# Patient Record
Sex: MALE | Race: WHITE | NOT HISPANIC OR LATINO | Employment: FULL TIME | URBAN - METROPOLITAN AREA
[De-identification: names, ages, dates, MRNs, and addresses within clinical notes are randomized per-mention and may not be internally consistent; named-entity substitution may affect disease eponyms.]

---

## 2019-07-04 ENCOUNTER — APPOINTMENT (OUTPATIENT)
Dept: CT IMAGING | Facility: HOSPITAL | Age: 54
End: 2019-07-04

## 2019-07-04 ENCOUNTER — APPOINTMENT (OUTPATIENT)
Dept: GENERAL RADIOLOGY | Facility: HOSPITAL | Age: 54
End: 2019-07-04

## 2019-07-04 ENCOUNTER — HOSPITAL ENCOUNTER (INPATIENT)
Facility: HOSPITAL | Age: 54
LOS: 1 days | Discharge: LEFT BEFORE DISCHARGE | End: 2019-07-04
Attending: EMERGENCY MEDICINE | Admitting: INTERNAL MEDICINE

## 2019-07-04 ENCOUNTER — APPOINTMENT (OUTPATIENT)
Dept: MRI IMAGING | Facility: HOSPITAL | Age: 54
End: 2019-07-04

## 2019-07-04 VITALS
BODY MASS INDEX: 25.34 KG/M2 | TEMPERATURE: 98.1 F | OXYGEN SATURATION: 99 % | HEIGHT: 70 IN | HEART RATE: 61 BPM | DIASTOLIC BLOOD PRESSURE: 66 MMHG | SYSTOLIC BLOOD PRESSURE: 119 MMHG | WEIGHT: 177.03 LBS | RESPIRATION RATE: 16 BRPM

## 2019-07-04 DIAGNOSIS — R29.810 FACIAL DROOP: ICD-10-CM

## 2019-07-04 DIAGNOSIS — R29.898 UPPER EXTREMITY WEAKNESS: ICD-10-CM

## 2019-07-04 DIAGNOSIS — R29.898 WEAKNESS OF RIGHT LOWER EXTREMITY: ICD-10-CM

## 2019-07-04 DIAGNOSIS — R29.90 STROKE-LIKE SYMPTOMS: Primary | ICD-10-CM

## 2019-07-04 LAB
ABO GROUP BLD: NORMAL
ALBUMIN SERPL-MCNC: 4.2 G/DL (ref 3.5–5.2)
ALBUMIN/GLOB SERPL: 1.7 G/DL
ALP SERPL-CCNC: 70 U/L (ref 39–117)
ALT SERPL W P-5'-P-CCNC: 21 U/L (ref 1–41)
ANION GAP SERPL CALCULATED.3IONS-SCNC: 9.8 MMOL/L (ref 5–15)
APTT PPP: 26.9 SECONDS (ref 22.7–35.4)
AST SERPL-CCNC: 23 U/L (ref 1–40)
BASOPHILS # BLD AUTO: 0.05 10*3/MM3 (ref 0–0.2)
BASOPHILS NFR BLD AUTO: 1 % (ref 0–1.5)
BILIRUB SERPL-MCNC: <0.2 MG/DL (ref 0.2–1.2)
BLD GP AB SCN SERPL QL: NEGATIVE
BUN BLD-MCNC: 17 MG/DL (ref 6–20)
BUN/CREAT SERPL: 21.5 (ref 7–25)
CALCIUM SPEC-SCNC: 9.5 MG/DL (ref 8.6–10.5)
CHLORIDE SERPL-SCNC: 108 MMOL/L (ref 98–107)
CO2 SERPL-SCNC: 29.2 MMOL/L (ref 22–29)
CREAT BLD-MCNC: 0.79 MG/DL (ref 0.76–1.27)
CRP SERPL-MCNC: 0.06 MG/DL (ref 0–0.5)
DEPRECATED RDW RBC AUTO: 46.6 FL (ref 37–54)
EOSINOPHIL # BLD AUTO: 0.41 10*3/MM3 (ref 0–0.4)
EOSINOPHIL NFR BLD AUTO: 8.4 % (ref 0.3–6.2)
ERYTHROCYTE [DISTWIDTH] IN BLOOD BY AUTOMATED COUNT: 13.6 % (ref 12.3–15.4)
ETHANOL BLD-MCNC: <10 MG/DL (ref 0–10)
ETHANOL UR QL: <0.01 %
FOLATE SERPL-MCNC: 11.4 NG/ML (ref 4.78–24.2)
GFR SERPL CREATININE-BSD FRML MDRD: 102 ML/MIN/1.73
GLOBULIN UR ELPH-MCNC: 2.5 GM/DL
GLUCOSE BLD-MCNC: 121 MG/DL (ref 65–99)
GLUCOSE BLDC GLUCOMTR-MCNC: 109 MG/DL (ref 70–130)
GLUCOSE BLDC GLUCOMTR-MCNC: 113 MG/DL (ref 70–130)
HCT VFR BLD AUTO: 42.3 % (ref 37.5–51)
HCYS SERPL-MCNC: 9.6 UMOL/L (ref 0–15)
HGB BLD-MCNC: 13.8 G/DL (ref 13–17.7)
HOLD SPECIMEN: NORMAL
HOLD SPECIMEN: NORMAL
IMM GRANULOCYTES # BLD AUTO: 0.01 10*3/MM3 (ref 0–0.05)
IMM GRANULOCYTES NFR BLD AUTO: 0.2 % (ref 0–0.5)
INR PPP: 0.99 (ref 0.9–1.1)
LYMPHOCYTES # BLD AUTO: 1.39 10*3/MM3 (ref 0.7–3.1)
LYMPHOCYTES NFR BLD AUTO: 28.6 % (ref 19.6–45.3)
MCH RBC QN AUTO: 30.5 PG (ref 26.6–33)
MCHC RBC AUTO-ENTMCNC: 32.6 G/DL (ref 31.5–35.7)
MCV RBC AUTO: 93.6 FL (ref 79–97)
MONOCYTES # BLD AUTO: 0.62 10*3/MM3 (ref 0.1–0.9)
MONOCYTES NFR BLD AUTO: 12.8 % (ref 5–12)
NEUTROPHILS # BLD AUTO: 2.38 10*3/MM3 (ref 1.7–7)
NEUTROPHILS NFR BLD AUTO: 49 % (ref 42.7–76)
NRBC BLD AUTO-RTO: 0 /100 WBC (ref 0–0.2)
PLATELET # BLD AUTO: 222 10*3/MM3 (ref 140–450)
PMV BLD AUTO: 8.9 FL (ref 6–12)
POTASSIUM BLD-SCNC: 4.2 MMOL/L (ref 3.5–5.2)
PROT SERPL-MCNC: 6.7 G/DL (ref 6–8.5)
PROTHROMBIN TIME: 12.8 SECONDS (ref 11.7–14.2)
RBC # BLD AUTO: 4.52 10*6/MM3 (ref 4.14–5.8)
RH BLD: POSITIVE
RPR SER QL: NORMAL
SODIUM BLD-SCNC: 147 MMOL/L (ref 136–145)
T&S EXPIRATION DATE: NORMAL
TROPONIN T SERPL-MCNC: <0.01 NG/ML (ref 0–0.03)
TSH SERPL DL<=0.05 MIU/L-ACNC: 1.2 MIU/ML (ref 0.27–4.2)
VIT B12 BLD-MCNC: 543 PG/ML (ref 211–946)
WBC NRBC COR # BLD: 4.86 10*3/MM3 (ref 3.4–10.8)
WHOLE BLOOD HOLD SPECIMEN: NORMAL
WHOLE BLOOD HOLD SPECIMEN: NORMAL

## 2019-07-04 PROCEDURE — A9577 INJ MULTIHANCE: HCPCS | Performed by: INTERNAL MEDICINE

## 2019-07-04 PROCEDURE — 25010000002 ALTEPLASE PER 1 MG

## 2019-07-04 PROCEDURE — 82962 GLUCOSE BLOOD TEST: CPT

## 2019-07-04 PROCEDURE — G0378 HOSPITAL OBSERVATION PER HR: HCPCS

## 2019-07-04 PROCEDURE — 85025 COMPLETE CBC W/AUTO DIFF WBC: CPT | Performed by: EMERGENCY MEDICINE

## 2019-07-04 PROCEDURE — 86901 BLOOD TYPING SEROLOGIC RH(D): CPT | Performed by: EMERGENCY MEDICINE

## 2019-07-04 PROCEDURE — 80307 DRUG TEST PRSMV CHEM ANLYZR: CPT | Performed by: EMERGENCY MEDICINE

## 2019-07-04 PROCEDURE — 85610 PROTHROMBIN TIME: CPT | Performed by: EMERGENCY MEDICINE

## 2019-07-04 PROCEDURE — 86592 SYPHILIS TEST NON-TREP QUAL: CPT | Performed by: PSYCHIATRY & NEUROLOGY

## 2019-07-04 PROCEDURE — 25010000002 ALTEPLASE PER 1 MG: Performed by: EMERGENCY MEDICINE

## 2019-07-04 PROCEDURE — 99253 IP/OBS CNSLTJ NEW/EST LOW 45: CPT | Performed by: PSYCHIATRY & NEUROLOGY

## 2019-07-04 PROCEDURE — 70498 CT ANGIOGRAPHY NECK: CPT

## 2019-07-04 PROCEDURE — 71045 X-RAY EXAM CHEST 1 VIEW: CPT

## 2019-07-04 PROCEDURE — 84484 ASSAY OF TROPONIN QUANT: CPT | Performed by: EMERGENCY MEDICINE

## 2019-07-04 PROCEDURE — 83090 ASSAY OF HOMOCYSTEINE: CPT | Performed by: PSYCHIATRY & NEUROLOGY

## 2019-07-04 PROCEDURE — 82565 ASSAY OF CREATININE: CPT

## 2019-07-04 PROCEDURE — 85730 THROMBOPLASTIN TIME PARTIAL: CPT | Performed by: EMERGENCY MEDICINE

## 2019-07-04 PROCEDURE — 82607 VITAMIN B-12: CPT | Performed by: PSYCHIATRY & NEUROLOGY

## 2019-07-04 PROCEDURE — 0 IOPAMIDOL PER 1 ML: Performed by: EMERGENCY MEDICINE

## 2019-07-04 PROCEDURE — 93005 ELECTROCARDIOGRAM TRACING: CPT | Performed by: EMERGENCY MEDICINE

## 2019-07-04 PROCEDURE — 93010 ELECTROCARDIOGRAM REPORT: CPT | Performed by: INTERNAL MEDICINE

## 2019-07-04 PROCEDURE — 86140 C-REACTIVE PROTEIN: CPT | Performed by: PSYCHIATRY & NEUROLOGY

## 2019-07-04 PROCEDURE — 0042T HC CT CEREBRAL PERFUSION W/WO CONTRAST: CPT

## 2019-07-04 PROCEDURE — 86850 RBC ANTIBODY SCREEN: CPT | Performed by: EMERGENCY MEDICINE

## 2019-07-04 PROCEDURE — 82746 ASSAY OF FOLIC ACID SERUM: CPT | Performed by: PSYCHIATRY & NEUROLOGY

## 2019-07-04 PROCEDURE — 70496 CT ANGIOGRAPHY HEAD: CPT

## 2019-07-04 PROCEDURE — 3E03317 INTRODUCTION OF OTHER THROMBOLYTIC INTO PERIPHERAL VEIN, PERCUTANEOUS APPROACH: ICD-10-PCS | Performed by: EMERGENCY MEDICINE

## 2019-07-04 PROCEDURE — 99285 EMERGENCY DEPT VISIT HI MDM: CPT

## 2019-07-04 PROCEDURE — 84443 ASSAY THYROID STIM HORMONE: CPT | Performed by: PSYCHIATRY & NEUROLOGY

## 2019-07-04 PROCEDURE — 80053 COMPREHEN METABOLIC PANEL: CPT | Performed by: EMERGENCY MEDICINE

## 2019-07-04 PROCEDURE — 86900 BLOOD TYPING SEROLOGIC ABO: CPT | Performed by: EMERGENCY MEDICINE

## 2019-07-04 PROCEDURE — 0 GADOBENATE DIMEGLUMINE 529 MG/ML SOLUTION: Performed by: INTERNAL MEDICINE

## 2019-07-04 PROCEDURE — 70553 MRI BRAIN STEM W/O & W/DYE: CPT

## 2019-07-04 RX ORDER — SODIUM CHLORIDE 0.9 % (FLUSH) 0.9 %
3-10 SYRINGE (ML) INJECTION AS NEEDED
Status: DISCONTINUED | OUTPATIENT
Start: 2019-07-04 | End: 2019-07-04 | Stop reason: HOSPADM

## 2019-07-04 RX ORDER — LORAZEPAM 1 MG/1
1 TABLET ORAL EVERY 8 HOURS
Status: DISCONTINUED | OUTPATIENT
Start: 2019-07-05 | End: 2019-07-04 | Stop reason: HOSPADM

## 2019-07-04 RX ORDER — ASPIRIN 325 MG
325 TABLET ORAL DAILY
Status: DISCONTINUED | OUTPATIENT
Start: 2019-07-05 | End: 2019-07-04 | Stop reason: HOSPADM

## 2019-07-04 RX ORDER — SODIUM CHLORIDE 0.9 % (FLUSH) 0.9 %
10 SYRINGE (ML) INJECTION AS NEEDED
Status: DISCONTINUED | OUTPATIENT
Start: 2019-07-04 | End: 2019-07-04 | Stop reason: HOSPADM

## 2019-07-04 RX ORDER — LORAZEPAM 1 MG/1
1 TABLET ORAL EVERY 6 HOURS
Status: DISCONTINUED | OUTPATIENT
Start: 2019-07-04 | End: 2019-07-04 | Stop reason: HOSPADM

## 2019-07-04 RX ORDER — SODIUM CHLORIDE 0.9 % (FLUSH) 0.9 %
3 SYRINGE (ML) INJECTION EVERY 12 HOURS SCHEDULED
Status: DISCONTINUED | OUTPATIENT
Start: 2019-07-04 | End: 2019-07-04 | Stop reason: HOSPADM

## 2019-07-04 RX ORDER — ASPIRIN 600 MG
300 SUPPOSITORY, RECTAL RECTAL DAILY
Status: DISCONTINUED | OUTPATIENT
Start: 2019-07-05 | End: 2019-07-04 | Stop reason: HOSPADM

## 2019-07-04 RX ORDER — LANOLIN ALCOHOL/MO/W.PET/CERES
400 CREAM (GRAM) TOPICAL DAILY
Status: DISCONTINUED | OUTPATIENT
Start: 2019-07-04 | End: 2019-07-04 | Stop reason: HOSPADM

## 2019-07-04 RX ORDER — SODIUM CHLORIDE 9 MG/ML
100 INJECTION, SOLUTION INTRAVENOUS ONCE
Status: COMPLETED | OUTPATIENT
Start: 2019-07-04 | End: 2019-07-04

## 2019-07-04 RX ORDER — PROPRANOLOL HYDROCHLORIDE 20 MG/1
20 TABLET ORAL DAILY
COMMUNITY

## 2019-07-04 RX ORDER — THIAMINE MONONITRATE (VIT B1) 100 MG
100 TABLET ORAL DAILY
Status: DISCONTINUED | OUTPATIENT
Start: 2019-07-04 | End: 2019-07-04 | Stop reason: HOSPADM

## 2019-07-04 RX ORDER — ATORVASTATIN CALCIUM 80 MG/1
80 TABLET, FILM COATED ORAL NIGHTLY
Status: DISCONTINUED | OUTPATIENT
Start: 2019-07-04 | End: 2019-07-04 | Stop reason: HOSPADM

## 2019-07-04 RX ADMIN — Medication 100 MG: at 12:36

## 2019-07-04 RX ADMIN — Medication 400 MCG: at 12:36

## 2019-07-04 RX ADMIN — SODIUM CHLORIDE 100 ML: 9 INJECTION, SOLUTION INTRAVENOUS at 09:43

## 2019-07-04 RX ADMIN — GADOBENATE DIMEGLUMINE 16 ML: 529 INJECTION, SOLUTION INTRAVENOUS at 13:25

## 2019-07-04 RX ADMIN — Medication 3 ML: at 09:17

## 2019-07-04 RX ADMIN — IOPAMIDOL 150 ML: 755 INJECTION, SOLUTION INTRAVENOUS at 08:15

## 2019-07-04 RX ADMIN — WATER 66 MG: 1 INJECTION INTRAMUSCULAR; INTRAVENOUS; SUBCUTANEOUS at 08:49

## 2019-07-04 NOTE — H&P
[facility]    CC: right le weakness    HPI:  Mr. Gutierrez is a 53yo WM he was brought to ER via EMS today with sudden onset of right lower extremity weakness that began at 700. No alleviating or exacerbating factors. Constant.  He then developed right arm weakness and facial drop.  They did start to improve on their own.  Ct head showed no bleed and he was given tpa.  He has no perfusion abnormality on ct head and his NIH has already improved to 0 at this point.    Past Medical History:   Diagnosis Date   • Hypertension      Social History     Socioeconomic History   • Marital status: Significant Other     Spouse name: Not on file   • Number of children: Not on file   • Years of education: Not on file   • Highest education level: Not on file   Tobacco Use   • Smoking status: Never Smoker   • Smokeless tobacco: Never Used   Substance and Sexual Activity   • Alcohol use: Yes     Alcohol/week: 0.6 oz     Types: 1 Shots of liquor per week   • Drug use: No   • Sexual activity: Defer     etoh every night not just once a week.     History reviewed. No pertinent family history.  MEDS: reviewed as per chart  ALL: NKDA  ROS:   Constitutional: Negative for activity change, appetite change and fever.   HENT: Negative for congestion and sore throat.    Eyes: Negative.  Negative for visual disturbance.   Respiratory: Negative for cough and shortness of breath.    Cardiovascular: Negative for chest pain and leg swelling.   Gastrointestinal: Negative for abdominal pain, diarrhea and vomiting.   Endocrine: Negative.    Genitourinary: Negative for decreased urine volume and dysuria.   Musculoskeletal: Negative for neck pain.   Skin: Negative for rash and wound.   Allergic/Immunologic: Negative.    Neurological: Positive for facial asymmetry (R sided droop) and weakness (R arm and leg). Negative for dizziness, speech difficulty, light-headedness, numbness and headaches.   Hematological: Negative.    Psychiatric/Behavioral:  Negative.    All other systems reviewed and are negative.    Vital Sign Min/Max for last 24 hours  Temp  Min: 98.7 °F (37.1 °C)  Max: 98.7 °F (37.1 °C)   BP  Min: 120/90  Max: 130/87   Pulse  Min: 60  Max: 72   Resp  Min: 18  Max: 18   SpO2  Min: 95 %  Max: 100 %   Flow (L/min)  Min: 2  Max: 2   Weight  Min: 81.1 kg (178 lb 12.7 oz)  Max: 81.1 kg (178 lb 12.7 oz)     GEN:  NAD, AxOx3  HEENT: PERRL, EOMI, no icterus, mmm, no jvd, trachea midline, neck supple, normal conjunctiva, and no palpable thyroid nodules  CHEST: CTA bilat, no wheezes, no crackles, no use of accessory muscles  CV: RRR, no m/g/r  ABD: soft, nt, nd +bs, no hepatosplenomegaly  EXT: no c/c/e  SKIN: no rashes, no xanthomas, nl turgor  LYMPH: no palpable cervical or supraclavicular lymphadenopathy  NEURO: CN 2-12 intact and symmetric bilaterally  PSYCH: nl affect, nl orientation, nl judgement, nl mood  MSK: no kyphoscoliosis, 5/5 strength ue and le bilaterally with good rom.     Labs: 7/4: reviewed:  Glucose 121  Bun 17  Na 147  Bicarb 29  Wbc 4.86  hgb 13.8  plts 222    Cxr: nad  Ct head: 7/4: reviewed:  1. No perfusion abnormality to suggest completed or threatened acute  infarct.  2. No arterial cutoff or high-grade arterial stenosis identified.  3. No acute intracranial hemorrhage or hydrocephalus. Slight asymmetric  hyperdensity at posterior medial left occipital lobe could be evidence  of prior ischemic injury. No enhancing lesions of brain. If indicated,  further evaluation with MRI could be considered.    A/P:  1. Acute CVA s/p TPA -- continue neurochecks in ICU: care as per post TPA protocol. Will need echo. Hgba1c; lipid panel;   2. HTN -- permissive at the moment, prn meds available if needed as per goal bp  3. etoh abuse -- will monitor for withdrawal, ciwa. Given thiamine, folate  4. Essential tremor    D/w RN and with family at bedside.

## 2019-07-04 NOTE — NURSING NOTE
Pt left AMA - paperwork is signed and MD notified.   Risks were explained and discussed in detail.   IV's removed. Significant other walked out with the patient.

## 2019-07-04 NOTE — CONSULTS
Neurology Note    Patient:  Darryl Gutierrez    YOB: 1965    REFERRING PHYSICIAN:  Galileo Anderson MD    CHIEF COMPLAINT:    weakness    HISTORY OF PRESENT ILLNESS:   The patient is a 54 y.o. male with h/o HTN and essential tremor in ED with sudden onset of right sided facial and limb weakness around 0700 today, initial NIHSS 4, /100, NSR, , CT/CTP/CTA negative. Reports a prior attack of right sided weakness at age 32 at which lasted for two days and resolved without a residual. He was hospitalized and thinks that his work up was negative but does not recall details. Denies headaches, h/o migraine, smoking, drug use.    Past Medical History:  Past Medical History:   Diagnosis Date   • Hypertension        Past Surgical History:  History reviewed. No pertinent surgical history.    Social History:   Social History     Socioeconomic History   • Marital status: Significant Other     Spouse name: Not on file   • Number of children: Not on file   • Years of education: Not on file   • Highest education level: Not on file   Tobacco Use   • Smoking status: Unknown If Ever Smoked   Substance and Sexual Activity   • Alcohol use: Yes     Alcohol/week: 0.6 oz     Types: 1 Shots of liquor per week   • Drug use: No   • Sexual activity: Defer        Family History:   History reviewed. No pertinent family history.    Medications Prior to Admission:    Prior to Admission medications    Medication Sig Start Date End Date Taking? Authorizing Provider   propranolol (INDERAL) 20 MG tablet Take 20 mg by mouth Daily.   Yes Provider, MD Paul       Allergies:  Patient has no known allergies.      Review of system  Review of Systems   Neurological: Positive for tremors, facial asymmetry and weakness. Negative for speech difficulty, numbness and headaches.   All other systems reviewed and are negative.      Vitals:    07/04/19 0853   BP: 120/90   Pulse: 63   Resp: 18   Temp:    SpO2: 98%       Physical  exam  Physical Exam   Constitutional: He is oriented to person, place, and time. He appears well-developed and well-nourished.   HENT:   Head: Normocephalic and atraumatic.   Eyes: EOM are normal. Pupils are equal, round, and reactive to light.   Neck: Carotid bruit is not present.   Cardiovascular: Normal rate and regular rhythm.   Pulmonary/Chest: Effort normal.   Neurological: He is alert and oriented to person, place, and time. He has normal reflexes. No sensory deficit.   Mild to moderate right lower facial droop, right pronator and leg drift, DTRs hypoactive, Babinskis indeterminate because of bilateral withdrawal.   Psychiatric: He has a normal mood and affect. His behavior is normal. Thought content normal.         Lab Results   Component Value Date    WBC 4.86 07/04/2019    HGB 13.8 07/04/2019    HCT 42.3 07/04/2019    MCV 93.6 07/04/2019     07/04/2019     Lab Results   Component Value Date    GLUCOSE 121 (H) 07/04/2019    BUN 17 07/04/2019    CREATININE 0.79 07/04/2019    EGFRIFNONA 102 07/04/2019    BCR 21.5 07/04/2019    CO2 29.2 (H) 07/04/2019    CALCIUM 9.5 07/04/2019    ALBUMIN 4.20 07/04/2019    AST 23 07/04/2019    ALT 21 07/04/2019     ECG 12 Lead   Order: [id number]   Status:  Preliminary result   Visible to patient:  No (Not Released)   Next appt:  None      Narrative     HEART RATE= 63  bpm  RR Interval= 960  ms  [place] Interval= 173  ms  P Horizontal Axis= -4  deg  P Front Axis= 43  deg  QRSD Interval= 104  ms  QT Interval= 407  ms  QRS Axis= -34  deg  T Wave Axis= -14  deg  - BORDERLINE ECG -  Sinus rhythm  Left axis deviation  Borderline T abnormalities, inferior leads  Electronically Signed By:   Date and Time of Study: 2019-07-04 08:31:05      Specimen Collected: 07/04/19 08:31 Last Resulted: 07/04/19 08:31               Radiological Studies:    Ct Angiogram Head With & Without Contrast    Result Date: 7/4/2019  CT ANGIOGRAM HEAD AND NECK AND CT PERFUSION STUDY  CLINICAL HISTORY: Right  sided weakness.  Radiation dose reduction techniques were utilized, including automated exposure control and exposure modulation based on body size. CT scan of the head was obtained with 3 mm axial images without the use of IV contrast. The patient underwent a CT perfusion study with a dynamic bolus of IV contrast. Standard perfusion maps were constructed. The patient then underwent a CT angiogram of the head and neck with 1 mm axial images following the administration of IV contrast. Sagittal, coronal, and 3-dimensional reconstructed images were obtained.  Percent stenosis was assessed in accordance with NASCET criteria.  FINDINGS:  NONCONTRAST HEAD CT: On the noncontrast head CT, no acute hemorrhage or hydrocephalus is identified. Slight asymmetric hypodensity posterior medially at the left occipital lobe, for example image 36 of series 1, potentially evidence of prior ischemic injury.  Arterial calcifications are seen at the base of the brain.  Mild mucosal thickening in maxillary and sphenoid sinuses, partial opacification of ethmoid air cells, hypoplastic frontal sinuses.  No enhancing lesions of brain are noted following contrast material administration.   CT PERFUSION STUDY:  No CBF (under 30%) deficit or perfusion abnormality is demonstrated where the T MAX is greater than 6 seconds. The calculated mismatch volume was 0 cc.  CTA HEAD and neck: The CT angiogram of the head and neck demonstrates no hemodynamically significant focal stenosis, aneurysm, or dissection in the cervical carotid or vertebral arteries, or in the arteries at the base of the brain. Left P1 segment is diminutive, left PCA being at least predominantly supplied by the anterior circulation. Calcifications are seen in the bilateral intracranial carotid arteries, but without significant stenosis (0-49% stenosis).          1. No perfusion abnormality to suggest completed or threatened acute infarct. 2. No arterial cutoff or high-grade arterial  stenosis identified. 3. No acute intracranial hemorrhage or hydrocephalus. Slight asymmetric hyperdensity at posterior medial left occipital lobe could be evidence of prior ischemic injury. No enhancing lesions of brain. If indicated, further evaluation with MRI could be considered.    Discussed by telephone with Dr. Walker at 0 810, 0 8:25, 07/04/2019.  This report was finalized on 7/4/2019 8:26 AM by Dr. Paulo Pate M.D.      Ct Angiogram Neck With & Without Contrast    Result Date: 7/4/2019  CT ANGIOGRAM HEAD AND NECK AND CT PERFUSION STUDY  CLINICAL HISTORY: Right sided weakness.  Radiation dose reduction techniques were utilized, including automated exposure control and exposure modulation based on body size. CT scan of the head was obtained with 3 mm axial images without the use of IV contrast. The patient underwent a CT perfusion study with a dynamic bolus of IV contrast. Standard perfusion maps were constructed. The patient then underwent a CT angiogram of the head and neck with 1 mm axial images following the administration of IV contrast. Sagittal, coronal, and 3-dimensional reconstructed images were obtained.  Percent stenosis was assessed in accordance with NASCET criteria.  FINDINGS:  NONCONTRAST HEAD CT: On the noncontrast head CT, no acute hemorrhage or hydrocephalus is identified. Slight asymmetric hypodensity posterior medially at the left occipital lobe, for example image 36 of series 1, potentially evidence of prior ischemic injury.  Arterial calcifications are seen at the base of the brain.  Mild mucosal thickening in maxillary and sphenoid sinuses, partial opacification of ethmoid air cells, hypoplastic frontal sinuses.  No enhancing lesions of brain are noted following contrast material administration.   CT PERFUSION STUDY:  No CBF (under 30%) deficit or perfusion abnormality is demonstrated where the T MAX is greater than 6 seconds. The calculated mismatch volume was 0 cc.  CTA HEAD  and neck: The CT angiogram of the head and neck demonstrates no hemodynamically significant focal stenosis, aneurysm, or dissection in the cervical carotid or vertebral arteries, or in the arteries at the base of the brain. Left P1 segment is diminutive, left PCA being at least predominantly supplied by the anterior circulation. Calcifications are seen in the bilateral intracranial carotid arteries, but without significant stenosis (0-49% stenosis).          1. No perfusion abnormality to suggest completed or threatened acute infarct. 2. No arterial cutoff or high-grade arterial stenosis identified. 3. No acute intracranial hemorrhage or hydrocephalus. Slight asymmetric hyperdensity at posterior medial left occipital lobe could be evidence of prior ischemic injury. No enhancing lesions of brain. If indicated, further evaluation with MRI could be considered.    Discussed by telephone with Dr. Walker at 0 810, 0 8:25, 07/04/2019.  This report was finalized on 7/4/2019 8:26 AM by Dr. Paulo Pate M.D.      Xr Chest 1 View    Result Date: 7/4/2019  XR CHEST 1 VW-  HISTORY: Male who is 54 years-old,  stroke  TECHNIQUE: Frontal view of the chest  COMPARISON: None available  FINDINGS: Heart, mediastinum and pulmonary vasculature are unremarkable. No focal pulmonary consolidation, pleural effusion, or pneumothorax. No acute osseous process.      No evidence for acute pulmonary process. Follow-up as clinical indications persist.  This report was finalized on 7/4/2019 8:37 AM by Dr. Paulo Pate M.D.      Ct Cerebral Perfusion With & Without Contrast    Result Date: 7/4/2019  CT ANGIOGRAM HEAD AND NECK AND CT PERFUSION STUDY  CLINICAL HISTORY: Right sided weakness.  Radiation dose reduction techniques were utilized, including automated exposure control and exposure modulation based on body size. CT scan of the head was obtained with 3 mm axial images without the use of IV contrast. The patient underwent a CT  perfusion study with a dynamic bolus of IV contrast. Standard perfusion maps were constructed. The patient then underwent a CT angiogram of the head and neck with 1 mm axial images following the administration of IV contrast. Sagittal, coronal, and 3-dimensional reconstructed images were obtained.  Percent stenosis was assessed in accordance with NASCET criteria.  FINDINGS:  NONCONTRAST HEAD CT: On the noncontrast head CT, no acute hemorrhage or hydrocephalus is identified. Slight asymmetric hypodensity posterior medially at the left occipital lobe, for example image 36 of series 1, potentially evidence of prior ischemic injury.  Arterial calcifications are seen at the base of the brain.  Mild mucosal thickening in maxillary and sphenoid sinuses, partial opacification of ethmoid air cells, hypoplastic frontal sinuses.  No enhancing lesions of brain are noted following contrast material administration.   CT PERFUSION STUDY:  No CBF (under 30%) deficit or perfusion abnormality is demonstrated where the T MAX is greater than 6 seconds. The calculated mismatch volume was 0 cc.  CTA HEAD and neck: The CT angiogram of the head and neck demonstrates no hemodynamically significant focal stenosis, aneurysm, or dissection in the cervical carotid or vertebral arteries, or in the arteries at the base of the brain. Left P1 segment is diminutive, left PCA being at least predominantly supplied by the anterior circulation. Calcifications are seen in the bilateral intracranial carotid arteries, but without significant stenosis (0-49% stenosis).          1. No perfusion abnormality to suggest completed or threatened acute infarct. 2. No arterial cutoff or high-grade arterial stenosis identified. 3. No acute intracranial hemorrhage or hydrocephalus. Slight asymmetric hyperdensity at posterior medial left occipital lobe could be evidence of prior ischemic injury. No enhancing lesions of brain. If indicated, further evaluation with MRI  could be considered.    Discussed by telephone with Dr. Walker at 0 810, 0 8:25, 07/04/2019.  This report was finalized on 7/4/2019 8:26 AM by Dr. Paulo Pate M.D.          During this visit the following were done:  Labs Reviewed [x]    Labs Ordered []    Radiology Reports Reviewed [x]    Radiology Ordered []    EKG, echo, and/or stress test reviewed [x]    EEG results reviewed  []    EEG reviewed and interpreted per myself   []    Discussed case with neurointerventionalist or neuroradiologist []    Referring Provider Records Reviewed []    ER Records Reviewed [x]    Hospital Records Reviewed []    History Obtained From Family []    Radiological images view and Interpreted per myself [x]    Case Discussed with referring provider [x]     Decision to obtain and request outside records  []        Assessment and Plan     Threatened left hemispheric vs pontine stroke, suspect lacunar given negative CTP/CTA. TPA candidate.     - ICU observation per TPA protocol.   - NPO until cleared.   - -180, DBP <110.   - MRI brain w/wo.   - TTE.   - CRP, RPR, b12, folate, TSH.    Thanks,    Electronically signed by Maxwell Luke MD on 7/4/2019 at 9:02 AM

## 2019-07-04 NOTE — ED PROVIDER NOTES
EMERGENCY DEPARTMENT ENCOUNTER    CHIEF COMPLAINT  Chief Complaint: Extremity Weakness   History given by: Patient   History limited by: none   Room Number: 16/16  PMD: Provider, No Known      HPI:  Pt is a 54 y.o. male who presents to the ED via EMS complaining of R lower extremity weakness that began upon waking up at 0700 this AM. Per pt, weakness of leg led to fall upon getting out of bed. Pt states he developed R arm weakness and facial droop as well, but pt denies any dizziness, lightheadedness, HA, speech changes, visual changes, or numbness. Per pt, symptoms have improved since onset. Pt states he has hx of one episode of TIA 20 year ago that lasted 48 hours, with symptoms resolved without any findings. Per pt, he has hx of EtOH usage every night and hx of essential tremor managed with Propanolol, but denies recreational drug abuse, hx of HTN, hx of cardiac problems, or any other significant medical issues.     Duration:  Constant   Onset: gradual   Timing: constant   Location: R leg   Radiation: none   Quality: weakness   Intensity/Severity: moderate to severe   Progression: improved   Associated Symptoms: R arm weakness and facial droop   Aggravating Factors: none   Alleviating Factors: none   Previous Episodes: Pt has one similar episode 20 years ago.   Treatment before arrival: none     PAST MEDICAL HISTORY  Active Ambulatory Problems     Diagnosis Date Noted   • No Active Ambulatory Problems     Resolved Ambulatory Problems     Diagnosis Date Noted   • No Resolved Ambulatory Problems     Past Medical History:   Diagnosis Date   • Hypertension        PAST SURGICAL HISTORY  History reviewed. No pertinent surgical history.    FAMILY HISTORY  History reviewed. No pertinent family history.    SOCIAL HISTORY  Social History     Socioeconomic History   • Marital status: Significant Other     Spouse name: Not on file   • Number of children: Not on file   • Years of education: Not on file   • Highest education  level: Not on file   Tobacco Use   • Smoking status: Unknown If Ever Smoked   Substance and Sexual Activity   • Alcohol use: Yes     Alcohol/week: 0.6 oz     Types: 1 Shots of liquor per week   • Drug use: No   • Sexual activity: Defer       ALLERGIES  Patient has no known allergies.    REVIEW OF SYSTEMS  Review of Systems   Constitutional: Negative for activity change, appetite change and fever.   HENT: Negative for congestion and sore throat.    Eyes: Negative.  Negative for visual disturbance.   Respiratory: Negative for cough and shortness of breath.    Cardiovascular: Negative for chest pain and leg swelling.   Gastrointestinal: Negative for abdominal pain, diarrhea and vomiting.   Endocrine: Negative.    Genitourinary: Negative for decreased urine volume and dysuria.   Musculoskeletal: Negative for neck pain.   Skin: Negative for rash and wound.   Allergic/Immunologic: Negative.    Neurological: Positive for facial asymmetry (R sided droop) and weakness (R arm and leg). Negative for dizziness, speech difficulty, light-headedness, numbness and headaches.   Hematological: Negative.    Psychiatric/Behavioral: Negative.    All other systems reviewed and are negative.      PHYSICAL EXAM  ED Triage Vitals   Temp Pulse Resp BP SpO2   -- -- -- -- --      Temp src Heart Rate Source Patient Position BP Location FiO2 (%)   -- -- -- -- --       Physical Exam   Constitutional: He is oriented to person, place, and time and well-developed, well-nourished, and in no distress. No distress.   HENT:   Mouth/Throat: Mucous membranes are normal.   Eyes: EOM are normal. Pupils are equal, round, and reactive to light.   Neck: Normal range of motion.   Cardiovascular: Normal rate and regular rhythm. Exam reveals no gallop and no friction rub.   No murmur heard.  Pulmonary/Chest: Effort normal. No respiratory distress. He has no wheezes. He has no rhonchi. He has no rales.   Abdominal: Soft. He exhibits no distension. There is no  tenderness. There is no guarding.   Musculoskeletal: He exhibits no deformity.   Neurological: He is alert and oriented to person, place, and time. He has normal sensation. He is not disoriented. He displays weakness (4/5 strength of RUE and RLE) and facial asymmetry (R sided). He displays normal speech. He has an abnormal Finger-Nose-Finger Test (with RUE and RLE) and an abnormal Heel to Shin Test (with RUE and RLE). He shows pronator drift. GCS score is 15.   See NIHS scale.    Skin: Skin is warm and dry.   Psychiatric:   Calm, cooperative       LAB RESULTS  Lab Results (last 24 hours)     Procedure Component Value Units Date/Time    POC Glucose Once [[id number]]  (Normal) Collected:  07/04/19 0758    Specimen:  Blood Updated:  07/04/19 0759     Glucose 113 mg/dL     CBC & Differential [[id number]] Collected:  07/04/19 0759    Specimen:  Blood Updated:  07/04/19 0808    Narrative:       The following orders were created for panel order CBC & Differential.  Procedure                               Abnormality         Status                     ---------                               -----------         ------                     CBC Auto Differential[[id number]]        Abnormal            Final result                 Please view results for these tests on the individual orders.    Comprehensive Metabolic Panel [[id number]]  (Abnormal) Collected:  07/04/19 0759    Specimen:  Blood Updated:  07/04/19 0822     Glucose 121 mg/dL      BUN 17 mg/dL      Creatinine 0.79 mg/dL      Sodium 147 mmol/L      Potassium 4.2 mmol/L      Chloride 108 mmol/L      CO2 29.2 mmol/L      Calcium 9.5 mg/dL      Total Protein 6.7 g/dL      Albumin 4.20 g/dL      ALT (SGPT) 21 U/L      AST (SGOT) 23 U/L      Alkaline Phosphatase 70 U/L      Total Bilirubin <0.2 mg/dL      eGFR Non African Amer 102 mL/min/1.73      Globulin 2.5 gm/dL      A/G Ratio 1.7 g/dL      BUN/Creatinine Ratio 21.5     Anion Gap 9.8 mmol/L     Narrative:       GFR Normal  >60  Chronic Kidney Disease <60  Kidney Failure <15    Protime-INR [[id number]] Collected:  07/04/19 0759    Specimen:  Blood Updated:  07/04/19 0804    aPTT [[id number]] Collected:  07/04/19 0759    Specimen:  Blood Updated:  07/04/19 0804    Troponin [[id number]]  (Normal) Collected:  07/04/19 0759    Specimen:  Blood Updated:  07/04/19 0822     Troponin T <0.010 ng/mL     Narrative:       Troponin T Reference Range:  <= 0.03 ng/mL-   Negative for AMI  >0.03 ng/mL-     Abnormal for myocardial necrosis.  Clinicians would have to utilize clinical acumen, EKG, Troponin and serial changes to determine if it is an Acute Myocardial Infarction or myocardial injury due to an underlying chronic condition.     CBC Auto Differential [[id number]]  (Abnormal) Collected:  07/04/19 0759    Specimen:  Blood Updated:  07/04/19 0808     WBC 4.86 10*3/mm3      RBC 4.52 10*6/mm3      Hemoglobin 13.8 g/dL      Hematocrit 42.3 %      MCV 93.6 fL      MCH 30.5 pg      MCHC 32.6 g/dL      RDW 13.6 %      RDW-SD 46.6 fl      MPV 8.9 fL      Platelets 222 10*3/mm3      Neutrophil % 49.0 %      Lymphocyte % 28.6 %      Monocyte % 12.8 %      Eosinophil % 8.4 %      Basophil % 1.0 %      Immature Grans % 0.2 %      Neutrophils, Absolute 2.38 10*3/mm3      Lymphocytes, Absolute 1.39 10*3/mm3      Monocytes, Absolute 0.62 10*3/mm3      Eosinophils, Absolute 0.41 10*3/mm3      Basophils, Absolute 0.05 10*3/mm3      Immature Grans, Absolute 0.01 10*3/mm3      nRBC 0.0 /100 WBC     Ethanol [[id number]] Collected:  07/04/19 0759    Specimen:  Blood Updated:  07/04/19 0821     Ethanol <10 mg/dL      Ethanol % <0.010 %           I ordered the above labs and reviewed the results    RADIOLOGY  XR Chest 1 View   Final Result   No evidence for acute pulmonary process. Follow-up as   clinical indications persist.       This report was finalized on 7/4/2019 8:37 AM by Dr. Paulo Pate M.D.          CT Cerebral Perfusion With & Without Contrast   Final  Result           1. No perfusion abnormality to suggest completed or threatened acute   infarct.   2. No arterial cutoff or high-grade arterial stenosis identified.   3. No acute intracranial hemorrhage or hydrocephalus. Slight asymmetric   hyperdensity at posterior medial left occipital lobe could be evidence   of prior ischemic injury. No enhancing lesions of brain. If indicated,   further evaluation with MRI could be considered.               Discussed by telephone with Dr. Walker at 0 810, 0 8:25, 07/04/2019.       This report was finalized on 7/4/2019 8:26 AM by Dr. Paulo Pate M.D.          CT Angiogram Head With & Without Contrast   Final Result           1. No perfusion abnormality to suggest completed or threatened acute   infarct.   2. No arterial cutoff or high-grade arterial stenosis identified.   3. No acute intracranial hemorrhage or hydrocephalus. Slight asymmetric   hyperdensity at posterior medial left occipital lobe could be evidence   of prior ischemic injury. No enhancing lesions of brain. If indicated,   further evaluation with MRI could be considered.               Discussed by telephone with Dr. Walker at 0 810, 0 8:25, 07/04/2019.       This report was finalized on 7/4/2019 8:26 AM by Dr. Paulo Pate M.D.          CT Angiogram Neck With & Without Contrast   Final Result           1. No perfusion abnormality to suggest completed or threatened acute   infarct.   2. No arterial cutoff or high-grade arterial stenosis identified.   3. No acute intracranial hemorrhage or hydrocephalus. Slight asymmetric   hyperdensity at posterior medial left occipital lobe could be evidence   of prior ischemic injury. No enhancing lesions of brain. If indicated,   further evaluation with MRI could be considered.               Discussed by telephone with Dr. Walker at 0 810, 0 8:25, 07/04/2019.       This report was finalized on 7/4/2019 8:26 AM by Dr. Paulo Pate M.D.                I ordered the above noted radiological studies. Interpreted by radiologist. Reviewed by me in PACS.       PROCEDURES  Critical Care  Performed by: Jt Baig MD  Authorized by: Jt Baig MD     Critical care provider statement:     Critical care time (minutes):  31    Critical care start time:  7/4/2019 7:54 AM    Critical care time was exclusive of:  Separately billable procedures and treating other patients    Critical care was necessary to treat or prevent imminent or life-threatening deterioration of the following conditions:  CNS failure or compromise    Critical care was time spent personally by me on the following activities:  Development of treatment plan with patient or surrogate, discussions with consultants, evaluation of patient's response to treatment, examination of patient, obtaining history from patient or surrogate, ordering and performing treatments and interventions, ordering and review of laboratory studies, ordering and review of radiographic studies, re-evaluation of patient's condition and pulse oximetry    I assumed direction of critical care for this patient from another provider in my specialty: no          Interval: baseline(upon arrival)  1a. Level of Consciousness: 0-->Alert, keenly responsive  1b. LOC Questions: 0-->Answers both questions correctly  1c. LOC Commands: 0-->Performs both tasks correctly  2. Best Gaze: 0-->Normal  3. Visual: 0-->No visual loss  4. Facial Palsy: 1-->Minor paralysis (flattened nasolabial fold, asymmetry on smiling)  5a. Motor Arm, Left: 0-->No drift, limb holds 90 (or 45) degrees for full 10 secs  5b. Motor Arm, Right: 1-->Drift, limb holds 90 (or 45) degrees, but drifts down before full 10 secs, does not hit bed or other support  6a. Motor Leg, Left: 0-->No drift, leg holds 30 degree position for full 5 secs  6b. Motor Leg, Right: 1-->Drift, leg falls by the end of the 5-sec period but does not hit bed  7. Limb Ataxia: 1-->Present in one  limb  8. Sensory: 0-->Normal, no sensory loss  9. Best Language: 0-->No aphasia, normal  10. Dysarthria: 0-->Normal  11. Extinction and Inattention (formerly Neglect): 0-->No abnormality    Total (NIH Stroke Scale): 4    EKG          EKG time: 0831  Rhythm/Rate: NSR 63  P waves and WI: nml  QRS, axis: LAD    ST and T waves: T wave inversions in lead 3 and AVF, no STEMI    Interpreted Contemporaneously by me, independently viewed  No prior for comparison.     PROGRESS AND CONSULTS     0757: Team D called following my NIHS scale exam. Discussed with pt that his symptoms are suggestive of CVA/TIA, with plan for CT head in ED for further rule out.     0800: Discussed pt's case with Dr. Luke (Stroke Neurologist) who requested CTA and CTP of pt in ED for further plan of care. Pt not ruled out as a tPA candidate at this time, but tPA will not be given at this moment due to CTA not yet performed and pt's improvement of symptoms within one hour without intervention.     0802: Labs ordered for further evaluation. CT head, CTA, and CTP ordered for further rule out of CVA.     0813: Discussed results of CT scan with Dr. Luke following his review of CT scan. Pt's CT head is negative.     0825: Pt rechecked after returned from CT and pt's vitals are stable. Pt states his R arm weakness is greatly improved but not returned to baseline. Pt's R leg is still notably weak.     0826: Discussed normal CTP with Dr. Luke who stated pt is well within window of tPA and requested offer of tPA of patient.     0827: Pt rechecked and discussed call with Dr. Luke (Stroke Neurology) with offer of tPA. Informed of of risks versus benefits of having tPA performed, with increased risk of hemorrhage versus possible benefit of weakness with medication. Discussed with pt the plan for admission with or without tPA, with need for further monitoring and MRI with admission for further evaluation. Pt given several minutes for deliberation.      0837: Pt rechecked after allowing several minutes for deliberation. Pt's NIHS scale is now a 3 with improvement of drift of RUE but no improvement of R leg weakness or facial droop. Pt asked further questions about administration of tPA, discussed further risks and benefits and pt agreed with plan for administration of tPA. Discussed plan for ICU admission, monitoring, and MRI brain as prior discussed. Pt understands and agrees with the plan, all questions answered.    0845: tPA ordered for thrombolytics. Call placed to Pulmonology for ICU admission.     0848: Dr. Luke at bedside for evaluation of pt.     0851: Discussed pt's case with Dr. Anderson (Pulmonology) who agreed to admit pt to ICU for further monitoring due to administration of tPA.     0922: Pt rechecked and states symptoms have completely resolved. Per pt, he is able to wiggle toes and move RLE to baseline. Discussed plan for admission and monitoring as prior discussed.     TPA NOTE  Thrombolytic Therapy for Stroke  Time: 8:43 AM    Inclusion criteria:  Clinical diagnosis of ischemic stroke causing measurable neurologic deficit.    Exclusion criteria:  Historical: None.  Clinical: None.  Hematologic: None.  Head CT scan: None.  Relative exclusion criteria: None.  Additional relative exclusion criteria for treatment from 3-4.5 hours from symptom onset: None.    No exclusion criteria present.  No inclusion criteria present.    tPA given. Intensive monitoring performed:  blood pressure.  Complications: None.      MEDICAL DECISION MAKING  Results were reviewed/discussed with the patient and they were also made aware of online access. Pt also made aware that some labs, such as cultures, will not be resulted during ER visit and follow up with PMD is necessary.     MDM  Number of Diagnoses or Management Options  Facial droop:   Stroke-like symptoms:   Upper extremity weakness:   Weakness of right lower extremity:   Diagnosis management comments: Patient  presented approximately 1 hour after onset of right upper extremity right lower extremity weakness with some slight facial droop.  He states that his right upper extremity has slightly improved compared to onset, but maintains significant right lower extremity weakness and difficulty with control.  Initial NIH of 4, CTA/CTP without concerning findings, and after discussion with Dr. Luke with Neurology, tPA offered to the patient.  He did ultimately after discussions with myself and family decided to proceed with tPA.  Within 30 minutes of administering tPA, patient had near resolution of symptoms. Dr. Luke has evaluated at bedside, and Dr. Anderson with Pulmonary to hospitalize in the ICU for further monitoring.  Patient and family have been updated on the course and plan and need for hospital stay.       Amount and/or Complexity of Data Reviewed  Clinical lab tests: ordered and reviewed (Unremarkable )  Tests in the radiology section of CPT®: ordered and reviewed (CT head, CTP, and CTA - negative acute hemorrhage )  Tests in the medicine section of CPT®: ordered and reviewed (See note)  Discuss the patient with other providers: yes (Dr. Luke (Stroke Neurology)  Dr. Anderson (Pulmonology) )           DIAGNOSIS  Final diagnoses:   Stroke-like symptoms   Weakness of right lower extremity   Upper extremity weakness   Facial droop       DISPOSITION  ADMISSION    Discussed treatment plan and reason for admission with pt/family and admitting physician.  Pt/family voiced understanding of the plan for admission for further testing/treatment as needed.         Latest Documented Vital Signs:  As of 8:55 AM  BP- 126/74 HR- 72 Temp- 98.7 °F (37.1 °C) (Tympanic) O2 sat- 100%    --  Documentation assistance provided by kwame Lewis for Dr. Baig.  Information recorded by the kwame was done at my direction and has been verified and validated by me.          Marline Lewis  07/04/19 0924       Jt Baig,  MD  07/04/19 1138

## 2019-07-05 LAB — CREAT BLDA-MCNC: 0.8 MG/DL (ref 0.6–1.3)

## 2019-07-05 NOTE — PAYOR COMM NOTE
"Rosanna Gutierrez (54 y.o. Male)                         ATTENTION;    AUTH PENDING [id number] FOR REVIEW, REPLY TO UR DEPT,                        [phone] OR CALL YULIA BRADY [facility]  OR UR  [phone]         Date of Birth Social Security Number Address Home Phone MRN    1965  [id number]   [place] [address]  [id number]    [facility] Marital Status          None Significant Other       Admission Date Admission Type Admitting Provider Attending Provider Department, Room/Bed    7/4/19 Emergency Galileo Anderson MD  [facility] INTENSIVE CARE, I382/1    Discharge Date Discharge Disposition Discharge Destination        7/4/2019 Left Against Medical Advice              Attending Provider:  (none)   Allergies:  No Known Allergies    Isolation:  None   Infection:  None   Code Status:  Prior    Ht:  177.8 cm (70\")   Wt:  80.3 kg (177 lb 0.5 oz)    Admission Cmt:  None   Principal Problem:  None                Active Insurance as of 7/4/2019     Primary Coverage     Payor Plan Insurance Group Employer/Plan Group    [employer]HE[employer] MEDICAID [place] MEDICAID [id number]     Payor Plan Address Payor Plan Phone Number Payor Plan Fax Number Effective Dates    [address] [address] [phone]  7/2/2019 - None Entered    [facility] [id number]       Subscriber Name Subscriber Birth Date Member ID       ROSANNA GUTIERREZ 1965 [id number]                 Emergency Contacts      (Rel.) Home Phone Work Phone Mobile Phone    Magy Galvin (Significant Other) [phone] -- [phone]               History & Physical      Seven Cohn MD at 7/4/2019 10:06 AM          [facility]    CC: right le weakness    HPI:  Mr. Gutierrez is a 53yo WM he was brought to ER via EMS today with sudden onset of right lower extremity weakness that began at 700. No alleviating or exacerbating factors. Constant.  He then developed right arm weakness and facial drop.  They did start to improve " on their own.  Ct head showed no bleed and he was given tpa.  He has no perfusion abnormality on ct head and his NIH has already improved to 0 at this point.    Past Medical History:   Diagnosis Date   • Hypertension      Social History     Socioeconomic History   • Marital status: Significant Other     Spouse name: Not on file   • Number of children: Not on file   • Years of education: Not on file   • Highest education level: Not on file   Tobacco Use   • Smoking status: Never Smoker   • Smokeless tobacco: Never Used   Substance and Sexual Activity   • Alcohol use: Yes     Alcohol/week: 0.6 oz     Types: 1 Shots of liquor per week   • Drug use: No   • Sexual activity: Defer     etoh every night not just once a week.     History reviewed. No pertinent family history.  MEDS: reviewed as per chart  ALL: NKDA  ROS:   Constitutional: Negative for activity change, appetite change and fever.   HENT: Negative for congestion and sore throat.    Eyes: Negative.  Negative for visual disturbance.   Respiratory: Negative for cough and shortness of breath.    Cardiovascular: Negative for chest pain and leg swelling.   Gastrointestinal: Negative for abdominal pain, diarrhea and vomiting.   Endocrine: Negative.    Genitourinary: Negative for decreased urine volume and dysuria.   Musculoskeletal: Negative for neck pain.   Skin: Negative for rash and wound.   Allergic/Immunologic: Negative.    Neurological: Positive for facial asymmetry (R sided droop) and weakness (R arm and leg). Negative for dizziness, speech difficulty, light-headedness, numbness and headaches.   Hematological: Negative.    Psychiatric/Behavioral: Negative.    All other systems reviewed and are negative.    Vital Sign Min/Max for last 24 hours  Temp  Min: 98.7 °F (37.1 °C)  Max: 98.7 °F (37.1 °C)   BP  Min: 120/90  Max: 130/87   Pulse  Min: 60  Max: 72   Resp  Min: 18  Max: 18   SpO2  Min: 95 %  Max: 100 %   Flow (L/min)  Min: 2  Max: 2   Weight  Min: 81.1 kg  (178 lb 12.7 oz)  Max: 81.1 kg (178 lb 12.7 oz)     GEN:  NAD, AxOx3  HEENT: PERRL, EOMI, no icterus, mmm, no jvd, trachea midline, neck supple, normal conjunctiva, and no palpable thyroid nodules  CHEST: CTA bilat, no wheezes, no crackles, no use of accessory muscles  CV: RRR, no m/g/r  ABD: soft, nt, nd +bs, no hepatosplenomegaly  EXT: no c/c/e  SKIN: no rashes, no xanthomas, nl turgor  LYMPH: no palpable cervical or supraclavicular lymphadenopathy  NEURO: CN 2-12 intact and symmetric bilaterally  PSYCH: nl affect, nl orientation, nl judgement, nl mood  MSK: no kyphoscoliosis, 5/5 strength ue and le bilaterally with good rom.     Labs: 7/4: reviewed:  Glucose 121  Bun 17  Na 147  Bicarb 29  Wbc 4.86  hgb 13.8  plts 222    Cxr: nad  Ct head: 7/4: reviewed:  1. No perfusion abnormality to suggest completed or threatened acute  infarct.  2. No arterial cutoff or high-grade arterial stenosis identified.  3. No acute intracranial hemorrhage or hydrocephalus. Slight asymmetric  hyperdensity at posterior medial left occipital lobe could be evidence  of prior ischemic injury. No enhancing lesions of brain. If indicated,  further evaluation with MRI could be considered.    A/P:  1. Acute CVA s/p TPA -- continue neurochecks in ICU: care as per post TPA protocol. Will need echo. Hgba1c; lipid panel;   2. HTN -- permissive at the moment, prn meds available if needed as per goal bp  3. etoh abuse -- will monitor for withdrawal, ciwa. Given thiamine, folate  4. Essential tremor    D/w RN and with family at bedside.     Electronically signed by Seven Cohn MD at 7/4/2019 10:37 AM          Emergency Department Notes      Jt Baig MD at 7/4/2019  7:54 AM      Procedure Orders    1. Critical Care [[id number]] ordered by Jt Baig MD at 07/04/19 0855                 EMERGENCY DEPARTMENT ENCOUNTER    CHIEF COMPLAINT  Chief Complaint: Extremity Weakness   History given by: Patient   History limited by: none   Room  Number: 16/16  PMD: Provider, No Known      HPI:  Pt is a 54 y.o. male who presents to the ED via EMS complaining of R lower extremity weakness that began upon waking up at 0700 this AM. Per pt, weakness of leg led to fall upon getting out of bed. Pt states he developed R arm weakness and facial droop as well, but pt denies any dizziness, lightheadedness, HA, speech changes, visual changes, or numbness. Per pt, symptoms have improved since onset. Pt states he has hx of one episode of TIA 20 year ago that lasted 48 hours, with symptoms resolved without any findings. Per pt, he has hx of EtOH usage every night and hx of essential tremor managed with Propanolol, but denies recreational drug abuse, hx of HTN, hx of cardiac problems, or any other significant medical issues.     Duration:  Constant   Onset: gradual   Timing: constant   Location: R leg   Radiation: none   Quality: weakness   Intensity/Severity: moderate to severe   Progression: improved   Associated Symptoms: R arm weakness and facial droop   Aggravating Factors: none   Alleviating Factors: none   Previous Episodes: Pt has one similar episode 20 years ago.   Treatment before arrival: none     PAST MEDICAL HISTORY  Active Ambulatory Problems     Diagnosis Date Noted   • No Active Ambulatory Problems     Resolved Ambulatory Problems     Diagnosis Date Noted   • No Resolved Ambulatory Problems     Past Medical History:   Diagnosis Date   • Hypertension        PAST SURGICAL HISTORY  History reviewed. No pertinent surgical history.    FAMILY HISTORY  History reviewed. No pertinent family history.    SOCIAL HISTORY  Social History     Socioeconomic History   • Marital status: Significant Other     Spouse name: Not on file   • Number of children: Not on file   • Years of education: Not on file   • Highest education level: Not on file   Tobacco Use   • Smoking status: Unknown If Ever Smoked   Substance and Sexual Activity   • Alcohol use: Yes     Alcohol/week: 0.6  oz     Types: 1 Shots of liquor per week   • Drug use: No   • Sexual activity: Defer       ALLERGIES  Patient has no known allergies.    REVIEW OF SYSTEMS  Review of Systems   Constitutional: Negative for activity change, appetite change and fever.   HENT: Negative for congestion and sore throat.    Eyes: Negative.  Negative for visual disturbance.   Respiratory: Negative for cough and shortness of breath.    Cardiovascular: Negative for chest pain and leg swelling.   Gastrointestinal: Negative for abdominal pain, diarrhea and vomiting.   Endocrine: Negative.    Genitourinary: Negative for decreased urine volume and dysuria.   Musculoskeletal: Negative for neck pain.   Skin: Negative for rash and wound.   Allergic/Immunologic: Negative.    Neurological: Positive for facial asymmetry (R sided droop) and weakness (R arm and leg). Negative for dizziness, speech difficulty, light-headedness, numbness and headaches.   Hematological: Negative.    Psychiatric/Behavioral: Negative.    All other systems reviewed and are negative.      PHYSICAL EXAM  ED Triage Vitals   Temp Pulse Resp BP SpO2   -- -- -- -- --      Temp src Heart Rate Source Patient Position BP Location FiO2 (%)   -- -- -- -- --       Physical Exam   Constitutional: He is oriented to person, place, and time and well-developed, well-nourished, and in no distress. No distress.   HENT:   Mouth/Throat: Mucous membranes are normal.   Eyes: EOM are normal. Pupils are equal, round, and reactive to light.   Neck: Normal range of motion.   Cardiovascular: Normal rate and regular rhythm. Exam reveals no gallop and no friction rub.   No murmur heard.  Pulmonary/Chest: Effort normal. No respiratory distress. He has no wheezes. He has no rhonchi. He has no rales.   Abdominal: Soft. He exhibits no distension. There is no tenderness. There is no guarding.   Musculoskeletal: He exhibits no deformity.   Neurological: He is alert and oriented to person, place, and time. He has  normal sensation. He is not disoriented. He displays weakness (4/5 strength of RUE and RLE) and facial asymmetry (R sided). He displays normal speech. He has an abnormal Finger-Nose-Finger Test (with RUE and RLE) and an abnormal Heel to Shin Test (with RUE and RLE). He shows pronator drift. GCS score is 15.   See NIHS scale.    Skin: Skin is warm and dry.   Psychiatric:   Calm, cooperative       LAB RESULTS  Lab Results (last 24 hours)     Procedure Component Value Units Date/Time    POC Glucose Once [[id number]]  (Normal) Collected:  07/04/19 0758    Specimen:  Blood Updated:  07/04/19 0759     Glucose 113 mg/dL     CBC & Differential [[id number]] Collected:  07/04/19 0759    Specimen:  Blood Updated:  07/04/19 0808    Narrative:       The following orders were created for panel order CBC & Differential.  Procedure                               Abnormality         Status                     ---------                               -----------         ------                     CBC Auto Differential[[id number]]        Abnormal            Final result                 Please view results for these tests on the individual orders.    Comprehensive Metabolic Panel [[id number]]  (Abnormal) Collected:  07/04/19 0759    Specimen:  Blood Updated:  07/04/19 0822     Glucose 121 mg/dL      BUN 17 mg/dL      Creatinine 0.79 mg/dL      Sodium 147 mmol/L      Potassium 4.2 mmol/L      Chloride 108 mmol/L      CO2 29.2 mmol/L      Calcium 9.5 mg/dL      Total Protein 6.7 g/dL      Albumin 4.20 g/dL      ALT (SGPT) 21 U/L      AST (SGOT) 23 U/L      Alkaline Phosphatase 70 U/L      Total Bilirubin <0.2 mg/dL      eGFR Non African Amer 102 mL/min/1.73      Globulin 2.5 gm/dL      A/G Ratio 1.7 g/dL      BUN/Creatinine Ratio 21.5     Anion Gap 9.8 mmol/L     Narrative:       GFR Normal >60  Chronic Kidney Disease <60  Kidney Failure <15    Protime-INR [[id number]] Collected:  07/04/19 0759    Specimen:  Blood Updated:  07/04/19 0804     aPTT [[id number]] Collected:  07/04/19 0759    Specimen:  Blood Updated:  07/04/19 0804    Troponin [[id number]]  (Normal) Collected:  07/04/19 0759    Specimen:  Blood Updated:  07/04/19 0822     Troponin T <0.010 ng/mL     Narrative:       Troponin T Reference Range:  <= 0.03 ng/mL-   Negative for AMI  >0.03 ng/mL-     Abnormal for myocardial necrosis.  Clinicians would have to utilize clinical acumen, EKG, Troponin and serial changes to determine if it is an Acute Myocardial Infarction or myocardial injury due to an underlying chronic condition.     CBC Auto Differential [[id number]]  (Abnormal) Collected:  07/04/19 0759    Specimen:  Blood Updated:  07/04/19 0808     WBC 4.86 10*3/mm3      RBC 4.52 10*6/mm3      Hemoglobin 13.8 g/dL      Hematocrit 42.3 %      MCV 93.6 fL      MCH 30.5 pg      MCHC 32.6 g/dL      RDW 13.6 %      RDW-SD 46.6 fl      MPV 8.9 fL      Platelets 222 10*3/mm3      Neutrophil % 49.0 %      Lymphocyte % 28.6 %      Monocyte % 12.8 %      Eosinophil % 8.4 %      Basophil % 1.0 %      Immature Grans % 0.2 %      Neutrophils, Absolute 2.38 10*3/mm3      Lymphocytes, Absolute 1.39 10*3/mm3      Monocytes, Absolute 0.62 10*3/mm3      Eosinophils, Absolute 0.41 10*3/mm3      Basophils, Absolute 0.05 10*3/mm3      Immature Grans, Absolute 0.01 10*3/mm3      nRBC 0.0 /100 WBC     Ethanol [[id number]] Collected:  07/04/19 0759    Specimen:  Blood Updated:  07/04/19 0821     Ethanol <10 mg/dL      Ethanol % <0.010 %           I ordered the above labs and reviewed the results    RADIOLOGY  XR Chest 1 View   Final Result   No evidence for acute pulmonary process. Follow-up as   clinical indications persist.       This report was finalized on 7/4/2019 8:37 AM by Dr. Paulo Pate M.D.          CT Cerebral Perfusion With & Without Contrast   Final Result           1. No perfusion abnormality to suggest completed or threatened acute   infarct.   2. No arterial cutoff or high-grade arterial stenosis  identified.   3. No acute intracranial hemorrhage or hydrocephalus. Slight asymmetric   hyperdensity at posterior medial left occipital lobe could be evidence   of prior ischemic injury. No enhancing lesions of brain. If indicated,   further evaluation with MRI could be considered.               Discussed by telephone with Dr. Walker at 0 810, 0 8:25, 07/04/2019.       This report was finalized on 7/4/2019 8:26 AM by Dr. Paulo Pate M.D.          CT Angiogram Head With & Without Contrast   Final Result           1. No perfusion abnormality to suggest completed or threatened acute   infarct.   2. No arterial cutoff or high-grade arterial stenosis identified.   3. No acute intracranial hemorrhage or hydrocephalus. Slight asymmetric   hyperdensity at posterior medial left occipital lobe could be evidence   of prior ischemic injury. No enhancing lesions of brain. If indicated,   further evaluation with MRI could be considered.               Discussed by telephone with Dr. Walker at 0 810, 0 8:25, 07/04/2019.       This report was finalized on 7/4/2019 8:26 AM by Dr. Paulo Pate M.D.          CT Angiogram Neck With & Without Contrast   Final Result           1. No perfusion abnormality to suggest completed or threatened acute   infarct.   2. No arterial cutoff or high-grade arterial stenosis identified.   3. No acute intracranial hemorrhage or hydrocephalus. Slight asymmetric   hyperdensity at posterior medial left occipital lobe could be evidence   of prior ischemic injury. No enhancing lesions of brain. If indicated,   further evaluation with MRI could be considered.               Discussed by telephone with Dr. Walker at 0 810, 0 8:25, 07/04/2019.       This report was finalized on 7/4/2019 8:26 AM by Dr. Paulo Pate M.D.               I ordered the above noted radiological studies. Interpreted by radiologist. Reviewed by me in PACS.       PROCEDURES  Critical Care  Performed by: Jovanni  Jt THRASHER MD  Authorized by: Jt Baig MD     Critical care provider statement:     Critical care time (minutes):  31    Critical care start time:  7/4/2019 7:54 AM    Critical care time was exclusive of:  Separately billable procedures and treating other patients    Critical care was necessary to treat or prevent imminent or life-threatening deterioration of the following conditions:  CNS failure or compromise    Critical care was time spent personally by me on the following activities:  Development of treatment plan with patient or surrogate, discussions with consultants, evaluation of patient's response to treatment, examination of patient, obtaining history from patient or surrogate, ordering and performing treatments and interventions, ordering and review of laboratory studies, ordering and review of radiographic studies, re-evaluation of patient's condition and pulse oximetry    I assumed direction of critical care for this patient from another provider in my specialty: no          Interval: baseline(upon arrival)  1a. Level of Consciousness: 0-->Alert, keenly responsive  1b. LOC Questions: 0-->Answers both questions correctly  1c. LOC Commands: 0-->Performs both tasks correctly  2. Best Gaze: 0-->Normal  3. Visual: 0-->No visual loss  4. Facial Palsy: 1-->Minor paralysis (flattened nasolabial fold, asymmetry on smiling)  5a. Motor Arm, Left: 0-->No drift, limb holds 90 (or 45) degrees for full 10 secs  5b. Motor Arm, Right: 1-->Drift, limb holds 90 (or 45) degrees, but drifts down before full 10 secs, does not hit bed or other support  6a. Motor Leg, Left: 0-->No drift, leg holds 30 degree position for full 5 secs  6b. Motor Leg, Right: 1-->Drift, leg falls by the end of the 5-sec period but does not hit bed  7. Limb Ataxia: 1-->Present in one limb  8. Sensory: 0-->Normal, no sensory loss  9. Best Language: 0-->No aphasia, normal  10. Dysarthria: 0-->Normal  11. Extinction and Inattention (formerly  Neglect): 0-->No abnormality    Total (NIH Stroke Scale): 4    EKG          EKG time: 0831  Rhythm/Rate: NSR 63  P waves and DC: nml  QRS, axis: LAD    ST and T waves: T wave inversions in lead 3 and AVF, no STEMI    Interpreted Contemporaneously by me, independently viewed  No prior for comparison.     PROGRESS AND CONSULTS     0757: Team D called following my NIHS scale exam. Discussed with pt that his symptoms are suggestive of CVA/TIA, with plan for CT head in ED for further rule out.     0800: Discussed pt's case with Dr. Luke (Stroke Neurologist) who requested CTA and CTP of pt in ED for further plan of care. Pt not ruled out as a tPA candidate at this time, but tPA will not be given at this moment due to CTA not yet performed and pt's improvement of symptoms within one hour without intervention.     0802: Labs ordered for further evaluation. CT head, CTA, and CTP ordered for further rule out of CVA.     0813: Discussed results of CT scan with Dr. Luke following his review of CT scan. Pt's CT head is negative.     0825: Pt rechecked after returned from CT and pt's vitals are stable. Pt states his R arm weakness is greatly improved but not returned to baseline. Pt's R leg is still notably weak.     0826: Discussed normal CTP with Dr. Luke who stated pt is well within window of tPA and requested offer of tPA of patient.     0827: Pt rechecked and discussed call with Dr. Luke (Stroke Neurology) with offer of tPA. Informed of of risks versus benefits of having tPA performed, with increased risk of hemorrhage versus possible benefit of weakness with medication. Discussed with pt the plan for admission with or without tPA, with need for further monitoring and MRI with admission for further evaluation. Pt given several minutes for deliberation.     0837: Pt rechecked after allowing several minutes for deliberation. Pt's NIHS scale is now a 3 with improvement of drift of RUE but no improvement of R leg  weakness or facial droop. Pt asked further questions about administration of tPA, discussed further risks and benefits and pt agreed with plan for administration of tPA. Discussed plan for ICU admission, monitoring, and MRI brain as prior discussed. Pt understands and agrees with the plan, all questions answered.    0845: tPA ordered for thrombolytics. Call placed to Pulmonology for ICU admission.     0848: Dr. Luke at bedside for evaluation of pt.     0851: Discussed pt's case with Dr. Anderson (Pulmonology) who agreed to admit pt to ICU for further monitoring due to administration of tPA.     0922: Pt rechecked and states symptoms have completely resolved. Per pt, he is able to wiggle toes and move RLE to baseline. Discussed plan for admission and monitoring as prior discussed.     TPA NOTE  Thrombolytic Therapy for Stroke  Time: 8:43 AM    Inclusion criteria:  Clinical diagnosis of ischemic stroke causing measurable neurologic deficit.    Exclusion criteria:  Historical: None.  Clinical: None.  Hematologic: None.  Head CT scan: None.  Relative exclusion criteria: None.  Additional relative exclusion criteria for treatment from 3-4.5 hours from symptom onset: None.    No exclusion criteria present.  No inclusion criteria present.    tPA given. Intensive monitoring performed:  blood pressure.  Complications: None.      MEDICAL DECISION MAKING  Results were reviewed/discussed with the patient and they were also made aware of online access. Pt also made aware that some labs, such as cultures, will not be resulted during ER visit and follow up with PMD is necessary.     MDM  Number of Diagnoses or Management Options  Facial droop:   Stroke-like symptoms:   Upper extremity weakness:   Weakness of right lower extremity:   Diagnosis management comments: Patient presented approximately 1 hour after onset of right upper extremity right lower extremity weakness with some slight facial droop.  He states that his right upper  extremity has slightly improved compared to onset, but maintains significant right lower extremity weakness and difficulty with control.  Initial NIH of 4, CTA/CTP without concerning findings, and after discussion with Dr. Luke with Neurology, tPA offered to the patient.  He did ultimately after discussions with myself and family decided to proceed with tPA.  Within 30 minutes of administering tPA, patient had near resolution of symptoms. Dr. Luke has evaluated at bedside, and Dr. Anderson with Pulmonary to hospitalize in the ICU for further monitoring.  Patient and family have been updated on the course and plan and need for hospital stay.       Amount and/or Complexity of Data Reviewed  Clinical lab tests: ordered and reviewed (Unremarkable )  Tests in the radiology section of CPT®:  ordered and reviewed (CT head, CTP, and CTA - negative acute hemorrhage )  Tests in the medicine section of CPT®:  ordered and reviewed (See note)  Discuss the patient with other providers: yes (Dr. Luke (Stroke Neurology)  Dr. Anderson (Pulmonology) )           DIAGNOSIS  Final diagnoses:   Stroke-like symptoms   Weakness of right lower extremity   Upper extremity weakness   Facial droop       DISPOSITION  ADMISSION    Discussed treatment plan and reason for admission with pt/family and admitting physician.  Pt/family voiced understanding of the plan for admission for further testing/treatment as needed.         Latest Documented Vital Signs:  As of 8:55 AM  BP- 126/74 HR- 72 Temp- 98.7 °F (37.1 °C) (Tympanic) O2 sat- 100%    --  Documentation assistance provided by kwame Lewis for Dr. Baig.  Information recorded by the scribe was done at my direction and has been verified and validated by me.          Marline Lewis  07/04/19 0924       Jt Baig MD  07/04/19 1134      Electronically signed by Jt Baig MD at 7/4/2019 11:34 AM       ICU Vital Signs     Row Name 07/04/19 1822 07/04/19 1808 07/04/19 1800  07/04/19 1730 07/04/19 1703       Vitals    Pulse  61  66  67  --  61    Resp  --  --  16  16  --    BP  --  119/66  119/66  120/78  118/74    Noninvasive MAP (mmHg)  --  85  --  --  85       Oxygen Therapy    SpO2  --  --  99 %  99 %  --    Device (Oxygen Therapy)  --  --  room air  room air  --    Row Name 07/04/19 1700 07/04/19 1630 07/04/19 1605 07/04/19 1600 07/04/19 1530       Vitals    Pulse  64  61  64  61  63    Resp  16  18  --  16  16    BP  117/74  121/72  123/76  123/76  135/95    Noninvasive MAP (mmHg)  --  85  100  --  --       Oxygen Therapy    SpO2  98 %  98 %  --  97 %  98 %    Device (Oxygen Therapy)  room air  room air  --  room air  room air    Row Name 07/04/19 1505 07/04/19 1500 07/04/19 1435 07/04/19 1430 07/04/19 1405       Vitals    Temp  98.1 °F (36.7 °C)  --  --  --  --    Temp src  Oral  --  --  --  --    Pulse  69  71  57  57  61    Resp  --  16  --  16  --    BP  127/82  127/82  116/73  116/73  116/75    Noninvasive MAP (mmHg)  94  --  85  --  87       Oxygen Therapy    SpO2  --  98 %  --  98 %  --    Device (Oxygen Therapy)  --  room air  --  room air  --    Row Name 07/04/19 1400 07/04/19 1355 07/04/19 1330 07/04/19 1300 07/04/19 1235       Vitals    Pulse  61  67  65  68  61    Resp  16  --  16  16  --    BP  116/75  125/66  124/68  120/67  108/70    Noninvasive MAP (mmHg)  --  88  --  --  84       Oxygen Therapy    SpO2  99 %  --  99 %  98 %  99 %    Device (Oxygen Therapy)  room air  --  room air  room air  --    Row Name 07/04/19 1230 07/04/19 1220 07/04/19 1200 07/04/19 1151 07/04/19 1130       Vitals    Pulse  63  67  62  68  61    Resp  16  --  16  --  18    BP  108/70  107/68  108/71  111/77  104/81    Noninvasive MAP (mmHg)  --  82  --  --  --       Oxygen Therapy    SpO2  98 %  100 %  100 %  100 %  100 %    Device (Oxygen Therapy)  room air  --  room air  --  room air    Row Name 07/04/19 1120 07/04/19 1105 07/04/19 1100 07/04/19 1050 07/04/19 1045       Vitals    Temp  --   --  98.9 °F (37.2 °C)  --  --    Temp src  --  --  Oral  --  --    Pulse  58  65  61  58  57    Resp  --  --  18  --  18    BP  109/73  109/81  109/81  114/83  114/83    Noninvasive MAP (mmHg)  79  96  --  97  --       Oxygen Therapy    SpO2  96 %  --  93 %  98 %  92 %    Device (Oxygen Therapy)  --  --  room air  --  room air    Row Name 07/04/19 1035 07/04/19 1030 07/04/19 1020 07/04/19 1015 07/04/19 1005       Vitals    Pulse  61  61  58  58  57    Resp  --  18  --  16  --    BP  129/78  129/78  108/80  108/80  120/81    Noninvasive MAP (mmHg)  94  --  89  --  94       Oxygen Therapy    SpO2  100 %  97 %  98 %  100 %  99 %    Device (Oxygen Therapy)  --  room air  --  room air  --    Row Name 07/04/19 1000 07/04/19 0945 07/04/19 0944             Height and Weight    Weight  80.3 kg (177 lb 0.5 oz)  --  --      Weight Method  Bed scale  --  --         Vitals    Pulse  60  --  60      Heart Rate Source  --  --  Monitor      Resp  --  18  18      Resp Rate Source  --  --  Visual      BP  121/81  122/83  122/83      Noninvasive MAP (mmHg)  92  --  --      BP Location  --  --  Right arm      BP Method  --  --  Automatic      Patient Position  --  --  Lying         Oxygen Therapy    SpO2  95 %  97 %  97 %      Pulse Oximetry Type  --  --  Continuous      Device (Oxygen Therapy)  room air  room air  room air          Lines, Drains & Airways    Active LDAs     None         Inactive LDAs     Name:   Placement date:   Placement time:   Removal date:   Removal time:   Site:   Days:    [REMOVED] Peripheral IV 07/04/19 0755 Left Antecubital   07/04/19    0755    07/04/19    1800    Antecubital   less than 1    [REMOVED] Peripheral IV 07/04/19 0829 Right Antecubital   07/04/19    0829    07/04/19    1730    Antecubital   less than 1                Hospital Medications (all)       Dose Frequency Start End    alteplase (ACTIVASE) 0.09 mg/kg = 7.2 mg in sterile water (preservative free) 7.2 mL bolus 0.09 mg/kg × 81.1 kg Once  "7/4/2019 7/4/2019    Sig - Route: Infuse 7.2 mL into a venous catheter 1 (One) Time. - Intravenous    alteplase (ACTIVASE) 66 mg in sterile water (preservative free) 66 mL (1 mg/mL) infusion 0.81 mg/kg × 81.1 kg Once 7/4/2019 7/4/2019    Sig - Route: Infuse 66 mL into a venous catheter 1 (One) Time. - Intravenous    gadobenate dimeglumine (MULTIHANCE) injection 16 mL 16 mL Once in Imaging 7/4/2019 7/4/2019    Sig - Route: Infuse 16 mL into a venous catheter Once. - Intravenous    iopamidol (ISOVUE-370) 76 % injection 150 mL 150 mL Once in Imaging 7/4/2019 7/4/2019    Sig - Route: Infuse 150 mL into a venous catheter Once. - Intravenous    sodium chloride 0.9 % infusion 100 mL 100 mL Once 7/4/2019 7/4/2019    Sig - Route: Infuse 100 mL into a venous catheter 1 (One) Time. - Intravenous    alteplase (ACTIVASE) 100 MG injection  - ADS Override Pull (Discontinued)   7/4/2019 7/4/2019    Notes to Pharmacy: Created by cabinet override    Reason for Discontinue: Patient Discharge    aspirin split suppository 300 mg (Discontinued) 300 mg Daily 7/5/2019 7/4/2019    Sig - Route: Insert 1 half suppository into the rectum Daily. - Rectal    Reason for Discontinue: Patient Discharge    Linked Group 1:  \"Or\" Linked Group Details        aspirin tablet 325 mg (Discontinued) 325 mg Daily 7/5/2019 7/4/2019    Sig - Route: Take 1 tablet by mouth Daily. - Oral    Reason for Discontinue: Patient Discharge    Linked Group 1:  \"Or\" Linked Group Details        atorvastatin (LIPITOR) tablet 80 mg (Discontinued) 80 mg Nightly 7/4/2019 7/4/2019    Sig - Route: Take 1 tablet by mouth Every Night. - Oral    Reason for Discontinue: Patient Discharge    folic acid (FOLVITE) tablet 400 mcg (Discontinued) 400 mcg Daily 7/4/2019 7/4/2019    Sig - Route: Take 1 tablet by mouth Daily. - Oral    Reason for Discontinue: Patient Discharge    LORazepam (ATIVAN) tablet 1 mg (Discontinued) 1 mg Every 6 Hours 7/4/2019 7/4/2019    Sig - Route: Take 1 tablet " "by mouth Every 6 (Six) Hours. - Oral    Reason for Discontinue: Patient Discharge    Linked Group 2:  \"Followed by\" Linked Group Details        LORazepam (ATIVAN) tablet 1 mg (Discontinued) 1 mg Every 8 Hours 7/5/2019 7/4/2019    Sig - Route: Take 1 tablet by mouth Every 8 (Eight) Hours. - Oral    Reason for Discontinue: Patient Discharge    Linked Group 2:  \"Followed by\" Linked Group Details        sodium chloride 0.9 % flush 10 mL (Discontinued) 10 mL As Needed 7/4/2019 7/4/2019    Sig - Route: Infuse 10 mL into a venous catheter As Needed for Line Care. - Intravenous    Reason for Discontinue: Patient Discharge    sodium chloride 0.9 % flush 3 mL (Discontinued) 3 mL Every 12 Hours Scheduled 7/4/2019 7/4/2019    Sig - Route: Infuse 3 mL into a venous catheter Every 12 (Twelve) Hours. - Intravenous    Reason for Discontinue: Patient Discharge    sodium chloride 0.9 % flush 3-10 mL (Discontinued) 3-10 mL As Needed 7/4/2019 7/4/2019    Sig - Route: Infuse 3-10 mL into a venous catheter As Needed for Line Care. - Intravenous    Reason for Discontinue: Patient Discharge    thiamine (VITAMIN B-1) tablet 100 mg (Discontinued) 100 mg Daily 7/4/2019 7/4/2019    Sig - Route: Take 1 tablet by mouth Daily. - Oral    Reason for Discontinue: Patient Discharge                 Consult Notes (last 48 hours) (Notes from 7/3/2019  9:39 AM through 7/5/2019  9:39 AM)      Maxwell Luke MD at 7/4/2019  9:02 AM      Consult Orders    1. Inpatient Neurology Consult Stroke [[id number]] ordered by Jt Baig MD at 07/04/19 0802                Neurology Note    Patient:  Darryl Gutierrez    YOB: 1965    REFERRING PHYSICIAN:  Galileo Anderson MD    CHIEF COMPLAINT:    weakness    HISTORY OF PRESENT ILLNESS:   The patient is a 54 y.o. male with h/o HTN and essential tremor in ED with sudden onset of right sided facial and limb weakness around 0700 today, initial NIHSS 4, /100, NSR, , CT/CTP/CTA negative. " Reports a prior attack of right sided weakness at age 32 at which lasted for two days and resolved without a residual. He was hospitalized and thinks that his work up was negative but does not recall details. Denies headaches, h/o migraine, smoking, drug use.    Past Medical History:  Past Medical History:   Diagnosis Date   • Hypertension        Past Surgical History:  History reviewed. No pertinent surgical history.    Social History:   Social History     Socioeconomic History   • Marital status: Significant Other     Spouse name: Not on file   • Number of children: Not on file   • Years of education: Not on file   • Highest education level: Not on file   Tobacco Use   • Smoking status: Unknown If Ever Smoked   Substance and Sexual Activity   • Alcohol use: Yes     Alcohol/week: 0.6 oz     Types: 1 Shots of liquor per week   • Drug use: No   • Sexual activity: Defer        Family History:   History reviewed. No pertinent family history.    Medications Prior to Admission:    Prior to Admission medications    Medication Sig Start Date End Date Taking? Authorizing Provider   propranolol (INDERAL) 20 MG tablet Take 20 mg by mouth Daily.   Yes Provider, Historical, MD       Allergies:  Patient has no known allergies.      Review of system  Review of Systems   Neurological: Positive for tremors, facial asymmetry and weakness. Negative for speech difficulty, numbness and headaches.   All other systems reviewed and are negative.      Vitals:    07/04/19 0853   BP: 120/90   Pulse: 63   Resp: 18   Temp:    SpO2: 98%       Physical exam  Physical Exam   Constitutional: He is oriented to person, place, and time. He appears well-developed and well-nourished.   HENT:   Head: Normocephalic and atraumatic.   Eyes: EOM are normal. Pupils are equal, round, and reactive to light.   Neck: Carotid bruit is not present.   Cardiovascular: Normal rate and regular rhythm.   Pulmonary/Chest: Effort normal.   Neurological: He is alert and  oriented to person, place, and time. He has normal reflexes. No sensory deficit.   Mild to moderate right lower facial droop, right pronator and leg drift, DTRs hypoactive, Babinskis indeterminate because of bilateral withdrawal.   Psychiatric: He has a normal mood and affect. His behavior is normal. Thought content normal.         Lab Results   Component Value Date    WBC 4.86 07/04/2019    HGB 13.8 07/04/2019    HCT 42.3 07/04/2019    MCV 93.6 07/04/2019     07/04/2019     Lab Results   Component Value Date    GLUCOSE 121 (H) 07/04/2019    BUN 17 07/04/2019    CREATININE 0.79 07/04/2019    EGFRIFNONA 102 07/04/2019    BCR 21.5 07/04/2019    CO2 29.2 (H) 07/04/2019    CALCIUM 9.5 07/04/2019    ALBUMIN 4.20 07/04/2019    AST 23 07/04/2019    ALT 21 07/04/2019     ECG 12 Lead   Order: [id number]   Status:  Preliminary result   Visible to patient:  No (Not Released)   Next appt:  None      Narrative     HEART RATE= 63  bpm  RR Interval= 960  ms  PA Interval= 173  ms  P Horizontal Axis= -4  deg  P Front Axis= 43  deg  QRSD Interval= 104  ms  QT Interval= 407  ms  QRS Axis= -34  deg  T Wave Axis= -14  deg  - BORDERLINE ECG -  Sinus rhythm  Left axis deviation  Borderline T abnormalities, inferior leads  Electronically Signed By:   Date and Time of Study: 2019-07-04 08:31:05      Specimen Collected: 07/04/19 08:31 Last Resulted: 07/04/19 08:31               Radiological Studies:    Ct Angiogram Head With & Without Contrast    Result Date: 7/4/2019  CT ANGIOGRAM HEAD AND NECK AND CT PERFUSION STUDY  CLINICAL HISTORY: Right sided weakness.  Radiation dose reduction techniques were utilized, including automated exposure control and exposure modulation based on body size. CT scan of the head was obtained with 3 mm axial images without the use of IV contrast. The patient underwent a CT perfusion study with a dynamic bolus of IV contrast. Standard perfusion maps were constructed. The patient then underwent a CT angiogram  of the head and neck with 1 mm axial images following the administration of IV contrast. Sagittal, coronal, and 3-dimensional reconstructed images were obtained.  Percent stenosis was assessed in accordance with NASCET criteria.  FINDINGS:  NONCONTRAST HEAD CT: On the noncontrast head CT, no acute hemorrhage or hydrocephalus is identified. Slight asymmetric hypodensity posterior medially at the left occipital lobe, for example image 36 of series 1, potentially evidence of prior ischemic injury.  Arterial calcifications are seen at the base of the brain.  Mild mucosal thickening in maxillary and sphenoid sinuses, partial opacification of ethmoid air cells, hypoplastic frontal sinuses.  No enhancing lesions of brain are noted following contrast material administration.   CT PERFUSION STUDY:  No CBF (under 30%) deficit or perfusion abnormality is demonstrated where the T MAX is greater than 6 seconds. The calculated mismatch volume was 0 cc.  CTA HEAD and neck: The CT angiogram of the head and neck demonstrates no hemodynamically significant focal stenosis, aneurysm, or dissection in the cervical carotid or vertebral arteries, or in the arteries at the base of the brain. Left P1 segment is diminutive, left PCA being at least predominantly supplied by the anterior circulation. Calcifications are seen in the bilateral intracranial carotid arteries, but without significant stenosis (0-49% stenosis).          1. No perfusion abnormality to suggest completed or threatened acute infarct. 2. No arterial cutoff or high-grade arterial stenosis identified. 3. No acute intracranial hemorrhage or hydrocephalus. Slight asymmetric hyperdensity at posterior medial left occipital lobe could be evidence of prior ischemic injury. No enhancing lesions of brain. If indicated, further evaluation with MRI could be considered.    Discussed by telephone with Dr. Walker at 0 810, 0 8:25, 07/04/2019.  This report was finalized on 7/4/2019  8:26 AM by Dr. Paulo Pate M.D.      Ct Angiogram Neck With & Without Contrast    Result Date: 7/4/2019  CT ANGIOGRAM HEAD AND NECK AND CT PERFUSION STUDY  CLINICAL HISTORY: Right sided weakness.  Radiation dose reduction techniques were utilized, including automated exposure control and exposure modulation based on body size. CT scan of the head was obtained with 3 mm axial images without the use of IV contrast. The patient underwent a CT perfusion study with a dynamic bolus of IV contrast. Standard perfusion maps were constructed. The patient then underwent a CT angiogram of the head and neck with 1 mm axial images following the administration of IV contrast. Sagittal, coronal, and 3-dimensional reconstructed images were obtained.  Percent stenosis was assessed in accordance with NASCET criteria.  FINDINGS:  NONCONTRAST HEAD CT: On the noncontrast head CT, no acute hemorrhage or hydrocephalus is identified. Slight asymmetric hypodensity posterior medially at the left occipital lobe, for example image 36 of series 1, potentially evidence of prior ischemic injury.  Arterial calcifications are seen at the base of the brain.  Mild mucosal thickening in maxillary and sphenoid sinuses, partial opacification of ethmoid air cells, hypoplastic frontal sinuses.  No enhancing lesions of brain are noted following contrast material administration.   CT PERFUSION STUDY:  No CBF (under 30%) deficit or perfusion abnormality is demonstrated where the T MAX is greater than 6 seconds. The calculated mismatch volume was 0 cc.  CTA HEAD and neck: The CT angiogram of the head and neck demonstrates no hemodynamically significant focal stenosis, aneurysm, or dissection in the cervical carotid or vertebral arteries, or in the arteries at the base of the brain. Left P1 segment is diminutive, left PCA being at least predominantly supplied by the anterior circulation. Calcifications are seen in the bilateral intracranial carotid  arteries, but without significant stenosis (0-49% stenosis).          1. No perfusion abnormality to suggest completed or threatened acute infarct. 2. No arterial cutoff or high-grade arterial stenosis identified. 3. No acute intracranial hemorrhage or hydrocephalus. Slight asymmetric hyperdensity at posterior medial left occipital lobe could be evidence of prior ischemic injury. No enhancing lesions of brain. If indicated, further evaluation with MRI could be considered.    Discussed by telephone with Dr. Walker at 0 810, 0 8:25, 07/04/2019.  This report was finalized on 7/4/2019 8:26 AM by Dr. Paulo Pate M.D.      Xr Chest 1 View    Result Date: 7/4/2019  XR CHEST 1 VW-  HISTORY: Male who is 54 years-old,  stroke  TECHNIQUE: Frontal view of the chest  COMPARISON: None available  FINDINGS: Heart, mediastinum and pulmonary vasculature are unremarkable. No focal pulmonary consolidation, pleural effusion, or pneumothorax. No acute osseous process.      No evidence for acute pulmonary process. Follow-up as clinical indications persist.  This report was finalized on 7/4/2019 8:37 AM by Dr. Paulo Pate M.D.      Ct Cerebral Perfusion With & Without Contrast    Result Date: 7/4/2019  CT ANGIOGRAM HEAD AND NECK AND CT PERFUSION STUDY  CLINICAL HISTORY: Right sided weakness.  Radiation dose reduction techniques were utilized, including automated exposure control and exposure modulation based on body size. CT scan of the head was obtained with 3 mm axial images without the use of IV contrast. The patient underwent a CT perfusion study with a dynamic bolus of IV contrast. Standard perfusion maps were constructed. The patient then underwent a CT angiogram of the head and neck with 1 mm axial images following the administration of IV contrast. Sagittal, coronal, and 3-dimensional reconstructed images were obtained.  Percent stenosis was assessed in accordance with NASCET criteria.  FINDINGS:  NONCONTRAST HEAD  CT: On the noncontrast head CT, no acute hemorrhage or hydrocephalus is identified. Slight asymmetric hypodensity posterior medially at the left occipital lobe, for example image 36 of series 1, potentially evidence of prior ischemic injury.  Arterial calcifications are seen at the base of the brain.  Mild mucosal thickening in maxillary and sphenoid sinuses, partial opacification of ethmoid air cells, hypoplastic frontal sinuses.  No enhancing lesions of brain are noted following contrast material administration.   CT PERFUSION STUDY:  No CBF (under 30%) deficit or perfusion abnormality is demonstrated where the T MAX is greater than 6 seconds. The calculated mismatch volume was 0 cc.  CTA HEAD and neck: The CT angiogram of the head and neck demonstrates no hemodynamically significant focal stenosis, aneurysm, or dissection in the cervical carotid or vertebral arteries, or in the arteries at the base of the brain. Left P1 segment is diminutive, left PCA being at least predominantly supplied by the anterior circulation. Calcifications are seen in the bilateral intracranial carotid arteries, but without significant stenosis (0-49% stenosis).          1. No perfusion abnormality to suggest completed or threatened acute infarct. 2. No arterial cutoff or high-grade arterial stenosis identified. 3. No acute intracranial hemorrhage or hydrocephalus. Slight asymmetric hyperdensity at posterior medial left occipital lobe could be evidence of prior ischemic injury. No enhancing lesions of brain. If indicated, further evaluation with MRI could be considered.    Discussed by telephone with Dr. Walker at 0 810, 0 8:25, 07/04/2019.  This report was finalized on 7/4/2019 8:26 AM by Dr. Paulo Pate M.D.          During this visit the following were done:  Labs Reviewed [x]    Labs Ordered []    Radiology Reports Reviewed [x]    Radiology Ordered []    EKG, echo, and/or stress test reviewed [x]    EEG results reviewed  []     EEG reviewed and interpreted per myself   []    Discussed case with neurointerventionalist or neuroradiologist []    Referring Provider Records Reviewed []    ER Records Reviewed [x]    Hospital Records Reviewed []    History Obtained From Family []    Radiological images view and Interpreted per myself [x]    Case Discussed with referring provider [x]     Decision to obtain and request outside records  []        Assessment and Plan     Threatened left hemispheric vs pontine stroke, suspect lacunar given negative CTP/CTA. TPA candidate.     - ICU observation per TPA protocol.   - NPO until cleared.   - -180, DBP <110.   - MRI brain w/wo.   - TTE.   - CRP, RPR, b12, folate, TSH.    Thanks,    Electronically signed by Maxwell Luke MD on 7/4/2019 at 9:02 AM        Electronically signed by Maxwell Luke MD at 7/4/2019  9:12 AM       Nemo Angeles, RN   Registered Nurse      Nursing Note   Signed   Date of Service:  7/4/2019  6:44 PM   Creation Time:  7/4/2019  6:44 PM            Signed                 Show:Clear all  [x]Manual[]Template[]Copied    Added by:  [x]Nemo Angeles, RN      []Hover for details      Pt left AMA - paperwork is signed and MD notified.   Risks were explained and discussed in detail.   IV's removed. Significant other walked out with the patient.                     ED to Hosp-Admission (Discharged) on 7/4/2019            Detailed Report

## 2019-07-08 NOTE — PROGRESS NOTES
Case Management Discharge Note    Final Note: Left AMA. Soheila Kirby CSW     Destination      No service has been selected for the patient.      Durable Medical Equipment      No service has been selected for the patient.      Dialysis/Infusion      No service has been selected for the patient.      Home Medical Care      No service has been selected for the patient.      Therapy      No service has been selected for the patient.      Community Resources      No service has been selected for the patient.             Final Discharge Disposition Code: 07 - left AMA

## 2019-10-02 ENCOUNTER — CALL CENTER PROGRAMS (OUTPATIENT)
Dept: CALL CENTER | Facility: HOSPITAL | Age: 54
End: 2019-10-02

## 2019-10-02 NOTE — OUTREACH NOTE
Stroke Rich Survey      Responses   [facility]   [facility] successful  No   Unsuccessful attempts  Attempt 1          Christine Goldsmith RN

## 2019-10-07 ENCOUNTER — CALL CENTER PROGRAMS (OUTPATIENT)
Dept: CALL CENTER | Facility: HOSPITAL | Age: 54
End: 2019-10-07

## 2019-10-07 NOTE — OUTREACH NOTE
Stroke Rich Survey      Respons[facility]   [facility]   Attempt successful  No   Unsuccessful attempts  Attempt 2          Christine Goldsmith RN

## 2019-10-08 ENCOUNTER — CALL CENTER PROGRAMS (OUTPATIENT)
Dept: CALL CENTER | Facility: HOSPITAL | Age: 54
End: 2019-10-08

## 2019-10-08 NOTE — OUTREACH NOTE
Stroke Rich Survey      Responses   [facility]   Attempt successful  No   Unsuccessful attempts  Attempt 3   Call Center Rich Score  7          Christine Goldsmith RN

## 2023-12-23 NOTE — PLAN OF CARE
Problem: Patient Care Overview  Goal: Plan of Care Review  Outcome: Ongoing (interventions implemented as appropriate)   07/04/19 1612   OTHER   Outcome Summary PT came to ICU post TPA - NIH now a 0. MRI completed - showed no areas of infarct or bleeding. Pt has stated multiple times that he does not intend to stay overnight - this was communicated to Dr. Cohn and the neurologist. Risks explained to patient. Vital signs stable. Will continue to monitor.    Coping/Psychosocial   Plan of Care Reviewed With patient   Plan of Care Review   Progress improving     Goal: Individualization and Mutuality  Outcome: Ongoing (interventions implemented as appropriate)    Goal: Discharge Needs Assessment  Outcome: Ongoing (interventions implemented as appropriate)    Goal: Interprofessional Rounds/Family Conf  Outcome: Ongoing (interventions implemented as appropriate)      Problem: Stroke (Ischemic) (Adult)  Goal: Signs and Symptoms of Listed Potential Problems Will be Absent, Minimized or Managed (Stroke)  Outcome: Ongoing (interventions implemented as appropriate)      Problem: Fall Risk (Adult)  Goal: Identify Related Risk Factors and Signs and Symptoms  Outcome: Ongoing (interventions implemented as appropriate)    Goal: Absence of Fall  Outcome: Ongoing (interventions implemented as appropriate)        
Problem: Patient Care Overview  Goal: Plan of Care Review  Outcome: Ongoing (interventions implemented as appropriate)   07/04/19 2875   OTHER   Outcome Summary Spoke with RN. Stated he passed nursing swallow screen, is doing well with regular diet, and MRI is negative. RN stated she would d/c order.         
hide